# Patient Record
Sex: MALE | Race: WHITE | Employment: FULL TIME | URBAN - METROPOLITAN AREA
[De-identification: names, ages, dates, MRNs, and addresses within clinical notes are randomized per-mention and may not be internally consistent; named-entity substitution may affect disease eponyms.]

---

## 2022-03-18 PROBLEM — E03.9 PRIMARY HYPOTHYROIDISM: Status: ACTIVE | Noted: 2020-10-23

## 2022-03-19 PROBLEM — E78.00 ELEVATED LDL CHOLESTEROL LEVEL: Status: ACTIVE | Noted: 2020-10-23

## 2022-03-19 PROBLEM — Z98.84 HISTORY OF BARIATRIC SURGERY: Status: ACTIVE | Noted: 2020-10-23

## 2022-03-19 PROBLEM — Z80.0 FAMILY HISTORY OF COLON CANCER IN MOTHER: Status: ACTIVE | Noted: 2020-10-23

## 2022-07-14 ENCOUNTER — OFFICE VISIT (OUTPATIENT)
Dept: OCCUPATIONAL MEDICINE | Age: 57
End: 2022-07-14

## 2022-07-14 DIAGNOSIS — L23.7 POISON IVY: Primary | ICD-10-CM

## 2022-07-14 PROCEDURE — 99213 OFFICE O/P EST LOW 20 MIN: CPT | Performed by: NURSE PRACTITIONER

## 2022-07-14 RX ORDER — PREDNISONE 10 MG/1
TABLET ORAL
Qty: 21 TABLET | Refills: 0 | Status: SHIPPED | OUTPATIENT
Start: 2022-07-14 | End: 2022-09-21 | Stop reason: ALTCHOICE

## 2022-07-14 RX ORDER — LEVOTHYROXINE SODIUM 0.05 MG/1
50 TABLET ORAL DAILY
COMMUNITY
Start: 2022-02-21 | End: 2022-09-21 | Stop reason: SDUPTHER

## 2022-07-14 RX ORDER — ONDANSETRON 4 MG/1
4 TABLET, ORALLY DISINTEGRATING ORAL 3 TIMES DAILY PRN
COMMUNITY
Start: 2021-10-14

## 2022-07-19 ASSESSMENT — ENCOUNTER SYMPTOMS
VOMITING: 0
SHORTNESS OF BREATH: 0
SORE THROAT: 0
NAUSEA: 0
COUGH: 0

## 2022-07-19 NOTE — PROGRESS NOTES
PROGRESS NOTE    SUBJECTIVE:   Lily López is a 64 y.o. male seen for a follow up visit regarding Rash   Dermatitis: Patient complains of a rash. Symptoms began several days ago. Patient describes the rash as erythematous, vesicles. Characteristics of rash and associated history: Similar rash in the past? yes, Aggravating factors? yes. Patient's previous dermatologic history includes  poison ivy . Family history of derm problems:  unknown . Medications currently using: none. Environmental exposures or allergies: poison ivy       Past Medical History, Past Surgical History, Family history, Social History, and Medications were all reviewed with the patient today and updated as necessary. Current Outpatient Medications   Medication Sig Dispense Refill    levothyroxine (SYNTHROID) 50 MCG tablet Take 50 mcg by mouth daily      ondansetron (ZOFRAN-ODT) 4 MG disintegrating tablet Place 4 mg under the tongue 3 times daily as needed      predniSONE (DELTASONE) 10 MG tablet Disp dose pack: use as directed. 21 tablet 0     No current facility-administered medications for this visit.      Not on File  Patient Active Problem List   Diagnosis    Primary hypothyroidism    History of bariatric surgery    Family history of colon cancer in mother    Elevated LDL cholesterol level     Past Medical History:   Diagnosis Date    DDD (degenerative disc disease), cervical     History of bariatric surgery 10/23/2020    Primary hypothyroidism      Past Surgical History:   Procedure Laterality Date    APPENDECTOMY      GASTRIC BYPASS      Sleeve gastrectomy    HERNIA REPAIR Bilateral     inguinal     ORTHOPEDIC SURGERY  1980    left knee     Family History   Problem Relation Age of Onset    Cancer Mother      Social History     Tobacco Use    Smoking status: Never    Smokeless tobacco: Never   Substance Use Topics    Alcohol use: Yes       Review of Systems   Constitutional:  Negative for chills and fatigue. HENT:  Negative for congestion, ear pain and sore throat. Respiratory:  Negative for cough and shortness of breath. Gastrointestinal:  Negative for nausea and vomiting. Musculoskeletal:  Negative for myalgias. Skin:  Positive for rash. Neurological:  Negative for headaches. OBJECTIVE:  There were no vitals taken for this visit. Physical Exam  Constitutional:       Appearance: Normal appearance. Cardiovascular:      Rate and Rhythm: Normal rate and regular rhythm. Pulmonary:      Effort: Pulmonary effort is normal.      Breath sounds: Normal breath sounds. Skin:     Findings: Erythema and rash present. Rash is vesicular. Neurological:      Mental Status: He is alert. ASSESSMENT and PLAN    Anusha Lamb was seen today for rash. Diagnoses and all orders for this visit:    Poison ivy    Other orders  -     predniSONE (DELTASONE) 10 MG tablet; Disp dose pack: use as directed. Reviewed triggers, prevention and hygiene. Take meds.   No improvement or worsening RTC>

## 2022-09-21 ENCOUNTER — OFFICE VISIT (OUTPATIENT)
Dept: OCCUPATIONAL MEDICINE | Age: 57
End: 2022-09-21

## 2022-09-21 DIAGNOSIS — E03.9 HYPOTHYROIDISM, UNSPECIFIED TYPE: Primary | ICD-10-CM

## 2022-09-21 RX ORDER — LEVOTHYROXINE SODIUM 0.05 MG/1
50 TABLET ORAL DAILY
Qty: 30 TABLET | Refills: 4 | Status: SHIPPED | OUTPATIENT
Start: 2022-09-21

## 2022-09-26 ASSESSMENT — ENCOUNTER SYMPTOMS
SHORTNESS OF BREATH: 0
ABDOMINAL PAIN: 0

## 2022-09-26 NOTE — PROGRESS NOTES
Gastrointestinal:  Negative for abdominal pain. Neurological:  Negative for dizziness, syncope and light-headedness. OBJECTIVE:  There were no vitals taken for this visit. Physical Exam  Constitutional:       Appearance: Normal appearance. Cardiovascular:      Rate and Rhythm: Normal rate and regular rhythm. Pulmonary:      Effort: Pulmonary effort is normal.      Breath sounds: Normal breath sounds. Neurological:      Mental Status: He is alert. ASSESSMENT and PLAN    Mert Koch was seen today for medication refill. Diagnoses and all orders for this visit:    Hypothyroidism, unspecified type    Other orders  -     levothyroxine (SYNTHROID) 50 MCG tablet; Take 1 tablet by mouth daily      Current treatment plan is effective, no change in therapy. Reviewed diet, exercise and weight control. Reviewed medications and side effects in detail. Reviewed potential future medication changes and side effects. Labs are due in 6 months. Stable.